# Patient Record
Sex: FEMALE | Race: WHITE | Employment: OTHER | URBAN - METROPOLITAN AREA
[De-identification: names, ages, dates, MRNs, and addresses within clinical notes are randomized per-mention and may not be internally consistent; named-entity substitution may affect disease eponyms.]

---

## 2019-06-13 ENCOUNTER — APPOINTMENT (OUTPATIENT)
Dept: MRI IMAGING | Facility: CLINIC | Age: 69
End: 2019-06-13
Attending: EMERGENCY MEDICINE
Payer: COMMERCIAL

## 2019-06-13 ENCOUNTER — HOSPITAL ENCOUNTER (EMERGENCY)
Facility: CLINIC | Age: 69
Discharge: HOME | End: 2019-06-13
Attending: EMERGENCY MEDICINE | Admitting: EMERGENCY MEDICINE
Payer: COMMERCIAL

## 2019-06-13 VITALS
OXYGEN SATURATION: 96 % | HEART RATE: 84 BPM | SYSTOLIC BLOOD PRESSURE: 156 MMHG | RESPIRATION RATE: 16 BRPM | DIASTOLIC BLOOD PRESSURE: 101 MMHG | TEMPERATURE: 98.5 F

## 2019-06-13 DIAGNOSIS — M54.16 LUMBAR RADICULOPATHY: ICD-10-CM

## 2019-06-13 PROCEDURE — 25000132 ZZH RX MED GY IP 250 OP 250 PS 637: Performed by: EMERGENCY MEDICINE

## 2019-06-13 PROCEDURE — 99284 EMERGENCY DEPT VISIT MOD MDM: CPT | Mod: 25

## 2019-06-13 PROCEDURE — 72148 MRI LUMBAR SPINE W/O DYE: CPT

## 2019-06-13 RX ORDER — METHYLPREDNISOLONE 4 MG
TABLET, DOSE PACK ORAL
Qty: 21 TABLET | Refills: 0 | Status: SHIPPED | OUTPATIENT
Start: 2019-06-13

## 2019-06-13 RX ORDER — LORAZEPAM 1 MG/1
1 TABLET ORAL ONCE
Status: COMPLETED | OUTPATIENT
Start: 2019-06-13 | End: 2019-06-13

## 2019-06-13 RX ORDER — IBUPROFEN 600 MG/1
600 TABLET, FILM COATED ORAL ONCE
Status: COMPLETED | OUTPATIENT
Start: 2019-06-13 | End: 2019-06-13

## 2019-06-13 RX ORDER — TIZANIDINE 2 MG/1
2 TABLET ORAL 3 TIMES DAILY PRN
Qty: 12 TABLET | Refills: 0 | Status: SHIPPED | OUTPATIENT
Start: 2019-06-13

## 2019-06-13 RX ORDER — IBUPROFEN 600 MG/1
600 TABLET, FILM COATED ORAL EVERY 6 HOURS PRN
Qty: 30 TABLET | Refills: 0 | Status: SHIPPED | OUTPATIENT
Start: 2019-06-13

## 2019-06-13 RX ORDER — CYCLOBENZAPRINE HCL 10 MG
10 TABLET ORAL 3 TIMES DAILY PRN
Qty: 12 TABLET | Refills: 0 | Status: SHIPPED | OUTPATIENT
Start: 2019-06-13 | End: 2019-06-19

## 2019-06-13 RX ORDER — ACETAMINOPHEN 500 MG
1000 TABLET ORAL ONCE
Status: COMPLETED | OUTPATIENT
Start: 2019-06-13 | End: 2019-06-13

## 2019-06-13 RX ADMIN — LORAZEPAM 1 MG: 1 TABLET ORAL at 18:31

## 2019-06-13 RX ADMIN — ACETAMINOPHEN 1000 MG: 500 TABLET, FILM COATED ORAL at 18:31

## 2019-06-13 RX ADMIN — IBUPROFEN 600 MG: 600 TABLET ORAL at 18:31

## 2019-06-13 ASSESSMENT — ENCOUNTER SYMPTOMS
WEAKNESS: 0
FEVER: 0
CHILLS: 0
NUMBNESS: 0
ROS GI COMMENTS: BOWEL INCONTINENCE
BACK PAIN: 1
WOUND: 0

## 2019-06-13 NOTE — ED PROVIDER NOTES
History     Chief Complaint:  Back Pain    HPI   Roxy Garcia is a 68 year old female who presents with back pain. The patient states she has had intermittent back problems ever since she can remember, though has never required surgery. However, about one year ago, she notes she went through physical therapy with good resolution of her symptoms. She has been doing well since then and has not had recurrent back pain until two weeks ago when she was walking and noticed she tweaked her back again; she cannot recall any obvious injury or twisting mechanism that provoked the recurrence of her back pain. Since then, the patient states she has started doing her physical therapy exercises four times a day and saw her physical therapist yesterday. She notes exacerbation of the pain with lifting her legs, lying flat on her back, or standing for >10 minutes. She notes alleviation of the pain with sitting upright with her knees bent or lying on her stomach. However, with the current flare of her back pain, the patient notes she has been experiencing bladder and bowel incontinence. She states she will not even notice the urge to urinate, but will totally soak through her pants, up to three times per day. Additionally, the patient reports she will have the urge to have a bowel movement, but cannot make it to the bathroom in time. Ultimately, the patient states she decided to come in today as she is supposed to go on vacation this weekend. Here, she also reports some tingling to her right thigh, but denies any numbness, asymmetric weakness, gait problems, previous steroid injections, or previous visits to a spine specialist. She states she did try taking 3 days of Prednisone about one week ago with minimal relief; she also reports taking a muscle relaxer intermittently, but does not like to use it often as it makes her  sleepy.    Allergies:  Aspirin  Oxycodone  Penicillins  Allopurinol  Lisinopril  Hydrocodone  Hydromorphone     Medications:    Amlodipine  Simvastatin  Losartan  Fluoxetine  Omeprazole  Potassium chloride  Levothyroxine  Tizanidine  Prednisone    Past Medical History:    Depression  Multiple lacunar infarcts  Obesity  Hypothyroidism  Osteoarthritis  Seasonal affective disorder  Circadian rhythm sleep disorder  Gout  Restrictive lung disease  Hypertension  Esophageal reflux  Hyperlipidemia  Nonrheumatic aortic valve disorder  Obstructive sleep apnea  Irritable bowel syndrome  Chronic diastolic congestive heart failure    Past Surgical History:    Partial thyroidectomy  Bunionectomy, bilateral  Cholecystectomy  Right 2nd metatarsal head resection  Open reduction internal fixation right elbow  Open reduction internal fixation revision right elbow  Resection of subungual mass, right thumb   section  Parathyroidectomy  Laparoscopic vertical sleep gastrectomy with hiatal hernia repair  Bilateral total knee arthroplasty  Cataract removal, bilateral  Left rotator cuff repair  Total abdominal hysterectomy and bilateral salpingo-oophorectomy    Family History:    Alcohol abuse  Myocardial infarction  Cataract  Type II diabetes mellitus  Hypothyroidism  Stroke  Gout  Drug abuse  Multiple sclerosis  Obesity  Amblyopia/strabismus    Social History:  Smoking status: No  Alcohol use: No  PCP: [facility], [place]  Marital Status:  [2]     Review of Systems   Constitutional: Negative for chills and fever.   Gastrointestinal:        Bowel incontinence   Genitourinary: Positive for enuresis.   Musculoskeletal: Positive for back pain.   Skin: Negative for wound.   Neurological: Negative for weakness and numbness.   All other systems reviewed and are negative.    Physical Exam     Patient Vitals for the past 24 hrs:   BP Temp Temp src Pulse Heart Rate Resp SpO2   19 (!) 156/101 -- -- -- -- -- 96  %   06/13/19 1800 (!) 149/97 -- -- 84 -- -- 96 %   06/13/19 1757 (!) 149/97 98.5  F (36.9  C) Oral 86 86 16 97 %     Physical Exam    HEENT: mmm  Neck: supple  CV: ppi, regular   Resp: speaking in full sentences with any resp distress  Abd: soft  Back: No TTP midline C/T/L spine, no significant Paraspinous muscle TTP  Skin: warm, dry, well perused, no rashes/bruising/lesions on exposed skin  Neuro: alert, follows commands, speech clear, strength 5/5 and symmetric, SILT in BUE  BLE   5/5 Strength Thigh Flex/Ext, Leg flex/Ext, PF/DF/EHL   SILT all 5 dermatomes BLE   Normal muscular tone                Gait stable    Psych: nomral mood and affect    Emergency Department Course   Imaging:  Radiographic findings were communicated with the patient who voiced understanding of the findings.    Lumbar spine MRI w/o Contrast  Multilevel degenerative disc and facet disease most  advanced at L3-4 and L4-5 where there are mild central canal stenoses  and mild-to-moderate bilateral neural foraminal stenoses.    As read by Radiology.    Interventions:  1831: 1000 mg Tylenol PO  1831: 600 mg Ibuprofen PO  1831: 1 mg Lorazepam PO    Emergency Department Course:  Past medical records, nursing notes, and vitals reviewed.  1812: I performed an exam of the patient and obtained history, as documented above.  The patient was sent for a lumbar spine MRI while in the emergency department, findings above.    2018: I rechecked the patient. Explained findings to the patient.    2029: Post-void bladder scan shows 122 mL.    2047: I rechecked the patient. Findings and plan explained to the patient. Patient discharged home with instructions regarding supportive care, medications, and reasons to return. The importance of close follow-up was reviewed.     Impression & Plan    Medical Decision Making:  Roxy Garcia is a 68 year old female who presents to the ED for evaluation of back pain x2 weeks, paraesthesias to her proximal lateral right  thigh, and new urinary incontinence. UA at clinic today was negative for infection. There was no appreciable motor or sensory deficits on examination here. We did a lumbar spine MRI to evaluate for a cauda equina syndrome which was negative but did show L3/4 disc herniation no cord/severe root compression.  Post void residual <150.      Given no motor/sensory deficit, no ongoing retention.  DC home, continue PT through outpt clinic (and spine referral), trial medrol, muscle relaxant, tylenol/nsaid    Diagnosis:    ICD-10-CM   1. Lumbar radiculopathy M54.16     Disposition: Discharged to home    Discharge Medications:  ibuprofen 600 MG tablet  Commonly known as:  ADVIL/MOTRIN  600 mg, Oral, EVERY 6 HOURS PRN     methylPREDNISolone 4 MG tablet therapy pack  Commonly known as:  MEDROL DOSEPAK  Follow package instructions     tiZANidine 2 MG tablet  Commonly known as:  ZANAFLEX  2 mg, Oral, 3 TIMES DAILY PRN       Emily Lemon  6/13/2019   [facility] EMERGENCY DEPARTMENT    IEmily, am serving as a scribe at 6:12 PM on 6/13/2019 to document services personally performed by Filiberto Hoffmann MD based on my observations and the provider's statements to me.      Filiberto Hoffmann MD  06/14/19 0144

## 2019-06-13 NOTE — ED TRIAGE NOTES
Pt presents with back pain that has been ongoing for the past 2 weeks with associated numbness and tingling in her lower legs, also c/o new incontinence. Pt alert, oriented x3 ABCs intact

## 2019-06-13 NOTE — ED AVS SNAPSHOT
[facility] Emergency Department  [address]  [facility] [address]  Phone:  [phone]  Fax:  [phone]                                    Roxy Garcia   MRN: [id number]    Department:  [facility] Emergency Department   Date of Visit:  6/13/2019           After Visit Summary Signature Page    I have received my discharge instructions, and my questions have been answered. I have discussed any challenges I see with this plan with the nurse or doctor.    ..........................................................................................................................................  Patient/Patient Representative Signature      ..........................................................................................................................................  Patient Representative Print Name and Relationship to Patient    ..................................................               ................................................  Date                                   Time    ..........................................................................................................................................  Reviewed by Signature/Title    ...................................................              ..............................................  Date                                               Time          22EPIC Rev 08/18

## 2019-06-14 NOTE — DISCHARGE INSTRUCTIONS
Return to ER immediately if you develop: worsening pain, new numbness or weakness in legs, loss of bowel or bladder control, inability to urinate, Fever > 101, persistent nausea or vomiting OR you have any other concerns about your health.      Discharge Instructions  Back Pain  You were seen today for back pain. Back pain can have many causes, but most will get better without surgery or other specific treatment. Sometimes there is a herniated (?slipped?) disc. We do not usually do MRI scans to look for these right away, since most herniated discs will get better on their own with time.  Today, we did not find any evidence that your back pain was caused by a serious condition. However, sometimes symptoms develop over time and cannot be found during an emergency visit, so it is very important that you follow up with your primary provider.  Generally, every Emergency Department visit should have a follow-up clinic visit with either a primary or a specialty clinic/provider. Please follow-up as instructed by your emergency provider today.    Return to the Emergency Department if:  You develop a fever with your back pain.   You have weakness or change in sensation in one or both legs.  You lose control of your bowels or bladder, or cannot empty your bladder (cannot pee).  Your pain gets much worse.     Follow-up with your provider:  Unless your pain has completely gone away, please make an appointment with your provider within one week. Most of the routine care for back pain is available in a clinic and not the Emergency Department. You may need further management of your back pain, such as more pain medication, imaging such as an X-ray or MRI, or physical therapy.    What can I do to help myself?  Remain Active -- People are often afraid that they will hurt their back further or delay recovery by remaining active, but this is one of the best things you can do for your back. In fact, staying in bed for a long time to  rest is not recommended. Studies have shown that people with low back pain recover faster when they remain active. Movement helps to bring blood flow to the muscles and relieve muscle spasms as well as preventing loss of muscle strength.  Heat -- Using a heating pad can help with low back pain during the first few weeks. Do not sleep with a heating pad, as you can be burned.   Pain medications - You may take a pain medication such as Tylenol  (acetaminophen), Advil , Motrin  (ibuprofen) or Aleve  (naproxen).  If you were given a prescription for medicine here today, be sure to read all of the information (including the package insert) that comes with your prescription.  This will include important information about the medicine, its side effects, and any warnings that you need to know about.  The pharmacist who fills the prescription can provide more information and answer questions you may have about the medicine.  If you have questions or concerns that the pharmacist cannot address, please call or return to the Emergency Department.   Remember that you can always come back to the Emergency Department if you are not able to see your regular provider in the amount of time listed above, if you get any new symptoms, or if there is anything that worries you.